# Patient Record
Sex: FEMALE | Race: OTHER | HISPANIC OR LATINO
[De-identification: names, ages, dates, MRNs, and addresses within clinical notes are randomized per-mention and may not be internally consistent; named-entity substitution may affect disease eponyms.]

---

## 2020-08-20 ENCOUNTER — TRANSCRIPTION ENCOUNTER (OUTPATIENT)
Age: 50
End: 2020-08-20

## 2023-02-10 ENCOUNTER — APPOINTMENT (OUTPATIENT)
Dept: BARIATRICS | Facility: CLINIC | Age: 53
End: 2023-02-10
Payer: MEDICARE

## 2023-02-10 ENCOUNTER — NON-APPOINTMENT (OUTPATIENT)
Age: 53
End: 2023-02-10

## 2023-02-10 VITALS
OXYGEN SATURATION: 99 % | BODY MASS INDEX: 43.36 KG/M2 | HEART RATE: 82 BPM | DIASTOLIC BLOOD PRESSURE: 84 MMHG | SYSTOLIC BLOOD PRESSURE: 136 MMHG | WEIGHT: 254 LBS | TEMPERATURE: 97 F | HEIGHT: 64 IN

## 2023-02-10 DIAGNOSIS — M54.9 DORSALGIA, UNSPECIFIED: ICD-10-CM

## 2023-02-10 DIAGNOSIS — K29.70 GASTRITIS, UNSPECIFIED, W/OUT BLEEDING: ICD-10-CM

## 2023-02-10 DIAGNOSIS — Z95.828 PRESENCE OF OTHER VASCULAR IMPLANTS AND GRAFTS: ICD-10-CM

## 2023-02-10 DIAGNOSIS — Z13.0 ENCOUNTER FOR SCREENING FOR DISEASES OF THE BLOOD AND BLOOD-FORMING ORGANS AND CERTAIN DISORDERS INVOLVING THE IMMUNE MECHANISM: ICD-10-CM

## 2023-02-10 DIAGNOSIS — K91.2 POSTSURGICAL MALABSORPTION, NOT ELSEWHERE CLASSIFIED: ICD-10-CM

## 2023-02-10 DIAGNOSIS — Z92.29 PERSONAL HISTORY OF OTHER DRUG THERAPY: ICD-10-CM

## 2023-02-10 DIAGNOSIS — Z13.0 ENCOUNTER FOR SCREENING FOR OTHER SUSPECTED ENDOCRINE DISORDER: ICD-10-CM

## 2023-02-10 DIAGNOSIS — E46 UNSPECIFIED PROTEIN-CALORIE MALNUTRITION: ICD-10-CM

## 2023-02-10 DIAGNOSIS — Z99.89 OBSTRUCTIVE SLEEP APNEA (ADULT) (PEDIATRIC): ICD-10-CM

## 2023-02-10 DIAGNOSIS — K45.8 OTHER SPECIFIED ABDOMINAL HERNIA W/OUT OBSTRUCTION OR GANGRENE: ICD-10-CM

## 2023-02-10 DIAGNOSIS — Z13.228 ENCOUNTER FOR SCREENING FOR OTHER METABOLIC DISORDERS: ICD-10-CM

## 2023-02-10 DIAGNOSIS — Z98.890 DORSALGIA, UNSPECIFIED: ICD-10-CM

## 2023-02-10 DIAGNOSIS — Z78.9 OTHER SPECIFIED HEALTH STATUS: ICD-10-CM

## 2023-02-10 DIAGNOSIS — G47.33 OBSTRUCTIVE SLEEP APNEA (ADULT) (PEDIATRIC): ICD-10-CM

## 2023-02-10 DIAGNOSIS — Z13.228 ENCOUNTER FOR SCREENING FOR OTHER SUSPECTED ENDOCRINE DISORDER: ICD-10-CM

## 2023-02-10 DIAGNOSIS — Z01.818 ENCOUNTER FOR OTHER PREPROCEDURAL EXAMINATION: ICD-10-CM

## 2023-02-10 DIAGNOSIS — Z13.29 ENCOUNTER FOR SCREENING FOR OTHER SUSPECTED ENDOCRINE DISORDER: ICD-10-CM

## 2023-02-10 DIAGNOSIS — I10 ESSENTIAL (PRIMARY) HYPERTENSION: ICD-10-CM

## 2023-02-10 PROCEDURE — 99205 OFFICE O/P NEW HI 60 MIN: CPT

## 2023-02-10 RX ORDER — HYDROCHLOROTHIAZIDE 12.5 MG/1
12.5 TABLET ORAL
Refills: 0 | Status: ACTIVE | COMMUNITY

## 2023-02-10 RX ORDER — MULTIVITAMIN
TABLET ORAL DAILY
Refills: 0 | Status: ACTIVE | COMMUNITY

## 2023-02-10 RX ORDER — LOSARTAN POTASSIUM 50 MG/1
50 TABLET, FILM COATED ORAL DAILY
Refills: 0 | Status: ACTIVE | COMMUNITY

## 2023-02-10 NOTE — REVIEW OF SYSTEMS
[Fatigue] : fatigue [Recent Change In Weight] : ~T recent weight change [Joint Pain] : joint pain [Muscle Pain] : muscle pain [Negative] : Allergic/Immunologic

## 2023-02-10 NOTE — ASSESSMENT
[FreeTextEntry1] : 53-year-old female with severe obesity, severe ROJAS, hypertension, with a history of lap band status post removal now status post sleeve gastrectomy here for initial consult for weight regain.  Patient has undergone work-up and clearances for conversion sleeve to bypass elsewhere, however is seeking a second opinion for other options for weight loss.\par \par I had a lengthy discussion with the patient regarding options for weight loss.  I first counseled her on proper nutrition and exercise (see below), discussed weight loss medications and approximate expected weight loss, and surgical options.  All questions were answered.  I suggested the patient first began with proper nutrition and exercise.\par -Follow-up in 6 weeks to reassess weight loss\par -Will determine appropriateness of weight loss medications and/or further surgery after first making changes to nutrition and exercise\par -all questions answered\par \par ------------\par Health maintenance and behavioral/nutrition counseling for obesity: An additional 30 minutes of the visit was spent counseling the patient regarding need for aggressive weight loss and behavior modification including nutrition and proper eating habits, including which foods to eat and avoid. \par I had a lengthy discussion with the patient regarding her current nutrition and eating habits as detailed above in the HPI. I emphasized the importance of making healthier food choices including fresh fruits and vegetables, lean meats and protein sources. I recommended front loading calories, incorporating whole grains, and eliminating fast foods. I also discussed the importance of avoiding fried/fatty foods and foods containing high sugar content including juices/shakes/sodas/desserts. \par I also encouraged beginning an exercise program and recommended cardiovascular exercises along with strength training to build lean muscle. I made suggestions on different types of exercises to try.\par Patient will work on the following:\par -Meet with nutritionist \par -Eliminate snacks \par -Focus on eating 3 well-balanced meals during the daytime with appropriate portion size\par -Cooking fresh meals rather than take out/processed/ready-made foods\par -Incorporating exercise\par ------------

## 2023-02-10 NOTE — HISTORY OF PRESENT ILLNESS
[de-identified] : MARIA D GARCIA is a 53 year F who presents today for initial bariatric evaluation. The patient has been overweight for at least 5 years and has failed diet and exercise programs to lose a significant amount of weight. Comorbidities include[ severe ROJAS, HTN, back surgery for scoliosis.\par Patient has history of lap band s/p removal, converted to sleeve gastrectomy. Preop weight 235lbs lowest 178lbs now 254lbs.\par \par Eating habits:\par Breakfast: starbucks\par Lunch: rice and beans, chicken\par Dinner: pizza, cereal \par Snacks: pistachios, chips, popcorn \par Drinks: iced tea sweetened, coffee and sugar, water \par \par Lifestyle: \par Sleep: 6hrs\par Physical activity/exercise: walking  \par Work: \par Lives with: two daughters and grand daughter \par Smoking/ETOH: nonsmoker, ETOH rarely\par \par Obstacles to losing weight: loses weight unable to keep off, has been successful with intermittent fasting.\par

## 2023-02-10 NOTE — PHYSICAL EXAM
[Obese] : obese [Normal] : affect appropriate [de-identified] : Anicteric, no conjunctival injection [de-identified] : Supple [de-identified] : Equal chest rise, nonlabored respirations. No audible wheezing. [de-identified] : Regular rate and rhythm. [de-identified] : Soft [de-identified] : No obvious deformity

## 2023-03-31 ENCOUNTER — TRANSCRIPTION ENCOUNTER (OUTPATIENT)
Age: 53
End: 2023-03-31

## 2023-03-31 ENCOUNTER — APPOINTMENT (OUTPATIENT)
Dept: BARIATRICS | Facility: CLINIC | Age: 53
End: 2023-03-31
Payer: MEDICARE

## 2023-03-31 VITALS
HEIGHT: 64 IN | HEART RATE: 76 BPM | WEIGHT: 267.2 LBS | BODY MASS INDEX: 45.62 KG/M2 | TEMPERATURE: 96.7 F | OXYGEN SATURATION: 98 % | DIASTOLIC BLOOD PRESSURE: 88 MMHG | SYSTOLIC BLOOD PRESSURE: 140 MMHG

## 2023-03-31 PROCEDURE — 99214 OFFICE O/P EST MOD 30 MIN: CPT

## 2023-03-31 NOTE — PHYSICAL EXAM
[Obese] : obese [Normal] : affect appropriate [de-identified] : Anicteric, no conjunctival injection [de-identified] : Supple [de-identified] : Equal chest rise, nonlabored respirations. No audible wheezing. [de-identified] : Regular rate and rhythm. [de-identified] : Soft, nontender [de-identified] : No obvious deformity

## 2023-03-31 NOTE — HISTORY OF PRESENT ILLNESS
[de-identified] : 53F here for follow up visit\par Patient has a history of lap band removal with conversion to sleeve gastrectomy now with weight regain\par She reports since her last visit with me she has been trying to make healthier food choices. Has been eating more salads\par She has been walking 4-5 times a week . Other exercise limited due to back pain\par She has been getting cortisone injections for her back pain

## 2023-03-31 NOTE — ASSESSMENT
[FreeTextEntry1] : 53-year-old female with severe obesity, severe ROJAS, hypertension, with a history of lap band status post removal now status post sleeve gastrectomy here for follow up for weight regain\par Despite making some dietary changes, patient has gained some more weight since last consultation\par -Nutrition again discussed with patient (see below)\par -will start phentermine/topiramate - patient educated regarding teratogenic effects. Patient states she is currently abstinent and verbalized understanding if she does become sexually active to use at least 2 forms of birth control\par -encouraged to increase exercise \par -f/u 1 month\par \par Nutrition: Discussed with pt  about the importance of lifestyle modifications, sleep hygiene, stress management for long-term weight loss success; specifically encouraged increasing activity, and  meal prepping.\par \par Continue zero-calorie liquids (64 oz/day)\par 3 protein-focused meals/day (aim for 60 g protein/day)\par Exercise with cardio (8 K- 10 K steps/day) and strength training 2 x -3 x/week\par Continue  vitamins daily\par Started patient on phentermine 18.75 mg every morning and Topiramate 25 mg nightly\par Follow up in 1 month\par Call with any questions or concerns\par \par Additional time spent before and after visit reviewing chart. \par \par

## 2023-04-04 ENCOUNTER — NON-APPOINTMENT (OUTPATIENT)
Age: 53
End: 2023-04-04

## 2023-04-28 ENCOUNTER — APPOINTMENT (OUTPATIENT)
Dept: BARIATRICS | Facility: CLINIC | Age: 53
End: 2023-04-28
Payer: MEDICARE

## 2023-04-28 VITALS
HEIGHT: 64 IN | OXYGEN SATURATION: 99 % | SYSTOLIC BLOOD PRESSURE: 140 MMHG | HEART RATE: 76 BPM | WEIGHT: 269.84 LBS | DIASTOLIC BLOOD PRESSURE: 86 MMHG | TEMPERATURE: 96.7 F | BODY MASS INDEX: 46.07 KG/M2

## 2023-04-28 DIAGNOSIS — E66.01 MORBID (SEVERE) OBESITY DUE TO EXCESS CALORIES: ICD-10-CM

## 2023-04-28 PROCEDURE — 99214 OFFICE O/P EST MOD 30 MIN: CPT

## 2023-04-28 RX ORDER — TOPIRAMATE 25 MG/1
25 TABLET, FILM COATED ORAL
Qty: 30 | Refills: 0 | Status: DISCONTINUED | COMMUNITY
Start: 2023-03-31 | End: 2023-04-28

## 2023-04-28 NOTE — HISTORY OF PRESENT ILLNESS
[de-identified] : 53 F here for follow up visit. Interested in going for revisional surgery. \par Patient has a history of lap band removal with conversion to sleeve gastrectomy now with weight regain. \par She reports since her last visit with me she has been trying to make healthier food choices. Has been eating more salads. Eats pizza for dinner and drinks seltzer. Started on phentermine / topiramate at last visit. Topiramate was denied by insurance. Pt C/O sleeplessness due to phentermine and stopped medication.\par She has been walking 4-5 times a week. Other exercise limited due to back pain\par C/O occasional abdominal pain. No nausea, vomiting, reflux, diarrhea or constipation\par \par

## 2023-04-28 NOTE — ASSESSMENT
[FreeTextEntry1] : 53F with severe obesity, s/p sleeve gastrectomy\par \par Discussed surgery with patient including RYGB and DESMOND, but will first try weight loss medications\par \par Discussed with pt  about the importance of lifestyle modifications, sleep hygiene, stress management for long-term weight loss success; specifically encouraged increasing activity, and  meal prepping.\par \par Continue zero-calorie liquids (64 oz/day)\par 3 protein-focused meals/day (aim for 60 g protein/day)\par Exercise limited due to back pain/surgery - recommended swimming\par Continue  vitamins daily\par Drink protein shake for dinner and avoid fast food\par \par Patient plans to switch insurance coverage in hopes of starting weight loss medications, will call office to f/u.\par f/u in 1 month\par \par Call with any questions or concerns\par \par Additional time spent before and after visit reviewing chart.\par \par \par

## 2023-04-28 NOTE — PHYSICAL EXAM
[Obese] : obese [Normal] : affect appropriate [de-identified] : Anicteric, no conjunctival injection [de-identified] : Supple [de-identified] : Equal chest rise, nonlabored respirations. No audible wheezing. [de-identified] : Regular rate and rhythm. [de-identified] : Soft, nontender [de-identified] : No obvious deformity

## 2023-05-26 ENCOUNTER — APPOINTMENT (OUTPATIENT)
Dept: BARIATRICS | Facility: CLINIC | Age: 53
End: 2023-05-26
Payer: MEDICARE

## 2023-05-26 VITALS
OXYGEN SATURATION: 99 % | SYSTOLIC BLOOD PRESSURE: 136 MMHG | BODY MASS INDEX: 44.75 KG/M2 | DIASTOLIC BLOOD PRESSURE: 88 MMHG | HEIGHT: 64 IN | HEART RATE: 74 BPM | TEMPERATURE: 97 F | WEIGHT: 262.13 LBS

## 2023-05-26 DIAGNOSIS — R10.9 UNSPECIFIED ABDOMINAL PAIN: ICD-10-CM

## 2023-05-26 PROCEDURE — 99214 OFFICE O/P EST MOD 30 MIN: CPT

## 2023-05-26 NOTE — ASSESSMENT
[FreeTextEntry1] : 53F with severe obesity, here for weight management\par History of bariatric surgery: band to sleeve\par Obesity comorbidities: ROJAS, \par Comorbidities improved/resolved: n/a\par Anti-obesity medication: phentermine\par Obesity medication side effects: none\par \par Discussed surgery with patient including RYGB and DESMOND, but will first try weight loss medications - on phentermine which she will tolerate\par \par Patient will d/w her PCP starting Topiramate for her migraine headaches which may also help with weight loss\par \par Discussed with pt about the importance of lifestyle modifications, sleep hygiene, stress management for long-term weight loss success; specifically encouraged increasing activity, and meal prepping.\par \par Will obtain CT abd/pelvis to r/o hernia for LUQ abdominal pain\par \par all questions answered\par f/u in 1 month

## 2023-05-26 NOTE — PHYSICAL EXAM
[Obese] : obese [Normal] : affect appropriate [de-identified] : Anicteric, no conjunctival injection [de-identified] : Supple [de-identified] : Equal chest rise, nonlabored respirations. No audible wheezing. [de-identified] : Regular rate and rhythm. [de-identified] : Soft, nontender [de-identified] : No obvious deformity

## 2023-05-26 NOTE — HISTORY OF PRESENT ILLNESS
[de-identified] : 53 F here for follow up visit for weight management. H/o sleeve gastrectomy\par Pt tolerating phentermine. Has lost some weight since last visit\par Nutrition: protein shake, salmon , salads  - making healthier food choices\par Walking daily \par Notes abdominal pain LUQ severe, sharp, improved with pressure. She has had it for a few yrs now. Unable to move when pain begins. Takes a few minutes to subside.

## 2023-06-02 ENCOUNTER — APPOINTMENT (OUTPATIENT)
Dept: CT IMAGING | Facility: CLINIC | Age: 53
End: 2023-06-02

## 2023-07-17 ENCOUNTER — RESULT REVIEW (OUTPATIENT)
Age: 53
End: 2023-07-17

## 2023-07-18 ENCOUNTER — OUTPATIENT (OUTPATIENT)
Dept: OUTPATIENT SERVICES | Facility: HOSPITAL | Age: 53
LOS: 1 days | End: 2023-07-18
Payer: COMMERCIAL

## 2023-07-18 ENCOUNTER — APPOINTMENT (OUTPATIENT)
Dept: CT IMAGING | Facility: IMAGING CENTER | Age: 53
End: 2023-07-18
Payer: MEDICARE

## 2023-07-18 DIAGNOSIS — R10.9 UNSPECIFIED ABDOMINAL PAIN: ICD-10-CM

## 2023-07-18 PROCEDURE — 74160 CT ABDOMEN W/CONTRAST: CPT | Mod: 26

## 2023-07-18 PROCEDURE — 74160 CT ABDOMEN W/CONTRAST: CPT

## 2023-07-25 ENCOUNTER — APPOINTMENT (OUTPATIENT)
Dept: BARIATRICS | Facility: CLINIC | Age: 53
End: 2023-07-25
Payer: MEDICARE

## 2023-07-25 VITALS
WEIGHT: 272 LBS | TEMPERATURE: 97 F | OXYGEN SATURATION: 98 % | DIASTOLIC BLOOD PRESSURE: 78 MMHG | HEART RATE: 72 BPM | BODY MASS INDEX: 46.44 KG/M2 | SYSTOLIC BLOOD PRESSURE: 134 MMHG | HEIGHT: 64 IN

## 2023-07-25 DIAGNOSIS — R16.0 HEPATOMEGALY, NOT ELSEWHERE CLASSIFIED: ICD-10-CM

## 2023-07-25 DIAGNOSIS — R63.5 ABNORMAL WEIGHT GAIN: ICD-10-CM

## 2023-07-25 DIAGNOSIS — Z98.84 BARIATRIC SURGERY STATUS: ICD-10-CM

## 2023-07-25 PROCEDURE — 99214 OFFICE O/P EST MOD 30 MIN: CPT

## 2023-07-25 RX ORDER — PHENTERMINE HYDROCHLORIDE 37.5 MG/1
37.5 TABLET ORAL DAILY
Qty: 30 | Refills: 0 | Status: ACTIVE | COMMUNITY
Start: 2023-03-31 | End: 1900-01-01

## 2023-07-25 RX ORDER — SEMAGLUTIDE 0.25 MG/.5ML
0.25 INJECTION, SOLUTION SUBCUTANEOUS
Qty: 1 | Refills: 0 | Status: ACTIVE | COMMUNITY
Start: 2023-07-25 | End: 1900-01-01

## 2023-07-25 NOTE — ASSESSMENT
[FreeTextEntry1] : 53F with severe obesity, here for weight management\par History of bariatric surgery: band converted to sleeve gastrectomy \par Obesity comorbidities: ROJAS, HTN, back/joint pain\par Comorbidities improved/resolved: none\par Anti-obesity medication: phentermine\par Obesity medication side effects: none\par \par Patient's weight loss has plateaued on phentermine and she has regained some weight. Discussed with patient to switch to Wegovy which would be a better option for patient and more efficacious than phentermine. Will submit for insurance approval.\par \par Also discussed further surgery with conversion of sleeve to bypass vs DESMOND. Will first try Wegovy and consider surgery if needed\par \par Discussed with pt about the importance of lifestyle modifications, sleep hygiene, stress management for long-term weight loss success; specifically encouraged increasing activity, and meal prepping.\par \par CT abd/pelvis reviewed with patient - 2.2cm liver lesion indeterminate will need MRI for further evaluation (ordered)\par \par all questions answered\par f/u 1 month after taking Wegovy

## 2023-07-25 NOTE — HISTORY OF PRESENT ILLNESS
[de-identified] : 53 F here for follow up visit for weight management. H/o sleeve gastrectomy\par Pt tolerating phentermine but weight loss has plateaued and patient has regained some weight\par Walking daily \par Notes abdominal pain LUQ severe, sharp, improved with pressure. She has had it for a few yrs now. Unable to move when pain begins. Takes a few minutes to subside. CT was done to r/o hernia which was negative for hernia in the area of symptoms, but has an umbilical and right lumbar hernia.\par \par \par

## 2023-07-25 NOTE — PHYSICAL EXAM
[Obese] : obese [Normal] : affect appropriate [de-identified] : Anicteric, no conjunctival injection [de-identified] : Supple [de-identified] : Equal chest rise, nonlabored respirations. No audible wheezing. [de-identified] : Regular rate and rhythm. [de-identified] : Soft, nontender [de-identified] : No obvious deformity